# Patient Record
Sex: MALE | Race: WHITE | NOT HISPANIC OR LATINO | Employment: OTHER | URBAN - NONMETROPOLITAN AREA
[De-identification: names, ages, dates, MRNs, and addresses within clinical notes are randomized per-mention and may not be internally consistent; named-entity substitution may affect disease eponyms.]

---

## 2017-01-19 PROCEDURE — 66984 XCAPSL CTRC RMVL W/O ECP: CPT | Performed by: OPHTHALMOLOGY

## 2017-01-20 ENCOUNTER — OFFICE VISIT (OUTPATIENT)
Dept: OPHTHALMOLOGY | Facility: CLINIC | Age: 82
End: 2017-01-20

## 2017-01-20 DIAGNOSIS — Z96.1 PSEUDOPHAKIA: Primary | ICD-10-CM

## 2017-01-20 RX ORDER — DIFLUPREDNATE OPHTHALMIC 0.5 MG/ML
1 EMULSION OPHTHALMIC 4 TIMES DAILY
COMMUNITY
End: 2017-02-14

## 2017-01-20 RX ORDER — OFLOXACIN 3 MG/ML
1 SOLUTION/ DROPS OPHTHALMIC 4 TIMES DAILY
COMMUNITY
End: 2017-02-14

## 2017-01-20 NOTE — PROGRESS NOTES
Subjective   Zeeshan Arnett is a 81 y.o. male.   Chief Complaint   Patient presents with   • Pseudophakia right eye     HPI     1 day s/p CE OD, no pain, Dr Villagomez, H25.11, global 90 days       Last edited by Devon Agudelo MD on 1/20/2017  8:22 AM.       Review of Systems    Objective   Visual Acuity (Snellen - Linear)      Right Left   Dist sc 20/40                    Not recorded            Tonometry (Applanation, 8:29 AM)      Right Left   Pressure 34               Main Ophthalmology Exam     External Exam      Right Left    External Normal Normal      Slit Lamp Exam      Right Left    Lids/Lashes Normal Normal    Conjunctiva/Sclera White and quiet White and quiet    Cornea Clear wound good, neg Megha Clear    Anterior Chamber Trace Cell Deep and quiet    Iris Round and reactive Round and reactive    Lens Posterior chamber intraocular lens 2+ Nuclear sclerosis    Vitreous Normal Normal                Assessment/Plan   Zeeshan was seen today for pseudophakia right eye.    Diagnoses and all orders for this visit:    Pseudophakia  Comments:  s/p CE OD, doing well with IOP incr    Other orders  -     Brinzolamide-Brimonidine (SIMBRINZA) 1-0.2 % suspension; One drop OD tid      Durezol bid, oflox qid, Ilevro daily, Simbrinza tid, Gaona shield hs  F/u 4 days  Copy to Dr Villagomez

## 2017-01-20 NOTE — MR AVS SNAPSHOT
Zeeshan Arnett   1/20/2017 8:00 AM   Office Visit    Dept Phone:  [phone]   Encounter #:  [id number]    Provider:  Devon Agudelo MD   Department:  [facility] OPHTHALMOLOGY                Your Full Care Plan              Today's Medication Changes          These changes are accurate as of: 1/20/17  8:29 AM.  If you have any questions, ask your nurse or doctor.               New Medication(s)Ordered:     Brinzolamide-Brimonidine 1-0.2 % suspension   Commonly known as:  SIMBRINZA   One drop OD tid   Started by:  Devon Agudelo MD            Where to Get Your Medications      Information about where to get these medications is not yet available     ! Ask your nurse or doctor about these medications     Brinzolamide-Brimonidine 1-0.2 % suspension                  Your Updated Medication List          This list is accurate as of: 1/20/17  8:29 AM.  Always use your most recent med list.                aspirin 81 MG chewable tablet       Brinzolamide-Brimonidine 1-0.2 % suspension   Commonly known as:  SIMBRINZA   One drop OD tid       clopidogrel 75 MG tablet   Commonly known as:  PLAVIX   Take 1 tablet by mouth Daily.       DUREZOL 0.05 % ophthalmic emulsion   Generic drug:  difluprednate       ILEVRO 0.3 % suspension   Generic drug:  Nepafenac       ofloxacin 0.3 % ophthalmic solution   Commonly known as:  OCUFLOX               You Were Diagnosed With        Codes Comments    Pseudophakia    -  Primary ICD-10-CM: Z96.1  ICD-9-CM: V43.1 s/p CE OD, doing well with IOP incr      Instructions     None    Patient Instructions History      Upcoming Appointments     Visit Type Date Time Department    OFFICE VISIT 1/20/2017  8:00 AM [facility] OPHTHALMOLOGY MAD    OFFICE VISIT 1/24/2017  9:20 AM [facility] OPHTHALMOLOGY MAD    FOLLOW UP - ONCOLOGY 4/20/2017  8:30 AM [facility] RAD ONC [facility]      MyChart Signup     [facility] allows you to send messages to your doctor,  view your test results, renew your prescriptions, schedule appointments, and more. To sign up, go to [employer] and click on the Sign Up Now link in the New User? box. Enter your OnState Activation Code exactly as it appears below along with the last four digits of your Social Security Number and your Date of Birth () to complete the sign-up process. If you do not sign up before the expiration date, you must request a new code.    OnState Activation Code: [email or web address]  Expires: 2/3/2017  8:29 AM    If you have questions, you can email [email or web address] or call [phone] to talk to our OnState staff. Remember, OnState is NOT to be used for urgent needs. For medical emergencies, dial 911.               Other Info from Your Visit           Your Appointments     2017  9:20 AM CST   Office Visit with Devon Agudelo MD   [facility] MEDICAL [facility] OPHTHALMOLOGY (--)    [address] [place] [address] [address]  [address] [place] [id number] [phone]           Arrive 15 minutes prior to appointment.            2017  8:30 AM CDT   FOLLOW UP with Aguila Rodriguez MD   [facility] RADIATION ONCOLOGY (--)    [address] [place] [address] [address] [place] [address] [phone]              Allergies     No Known Allergies      Reason for Visit     Pseudophakia right eye           Vital Signs     Smoking Status                   Former Smoker           Problems and Diagnoses Noted     Pseudophakia    -  Primary

## 2017-01-24 ENCOUNTER — OFFICE VISIT (OUTPATIENT)
Dept: OPHTHALMOLOGY | Facility: CLINIC | Age: 82
End: 2017-01-24

## 2017-01-24 DIAGNOSIS — Z96.1 PSEUDOPHAKIA: Primary | ICD-10-CM

## 2017-01-24 PROCEDURE — 99024 POSTOP FOLLOW-UP VISIT: CPT | Performed by: OPHTHALMOLOGY

## 2017-01-24 NOTE — MR AVS SNAPSHOT
Zeeshan Arnett   2017 9:20 AM   Office Visit    Dept Phone:  [phone]   Encounter #:  [id number]    Provider:  Devon Agudelo MD   Department:  [facility] OPHTHALMOLOGY                Your Full Care Plan              Your Updated Medication List          This list is accurate as of: 17 10:33 AM.  Always use your most recent med list.                aspirin 81 MG chewable tablet       Brinzolamide-Brimonidine 1-0.2 % suspension   Commonly known as:  SIMBRINZA   One drop OD tid       clopidogrel 75 MG tablet   Commonly known as:  PLAVIX   Take 1 tablet by mouth Daily.       DUREZOL 0.05 % ophthalmic emulsion   Generic drug:  difluprednate       ILEVRO 0.3 % suspension   Generic drug:  Nepafenac       ofloxacin 0.3 % ophthalmic solution   Commonly known as:  OCUFLOX               You Were Diagnosed With        Codes Comments    Pseudophakia    -  Primary ICD-10-CM: Z96.1  ICD-9-CM: V43.1 s/p CE OD, doing well, iOP down      Instructions     None    Patient Instructions History      Upcoming Appointments     Visit Type Date Time Department    OFFICE VISIT 2017  9:20 AM [facility] OPHTHALMOLOGY MAD    OFFICE VISIT 2017  8:30 AM [facility] OPHTHALMOLOGY MAD    FOLLOW UP - ONCOLOGY 2017  8:30 AM [facility] RAD ONC [facility]      RUNhart Signup     [facility] [employer] allows you to send messages to your doctor, view your test results, renew your prescriptions, schedule appointments, and more. To sign up, go to [employer] and click on the Sign Up Now link in the New User? box. Enter your Warp 9 Activation Code exactly as it appears below along with the last four digits of your Social Security Number and your Date of Birth () to complete the sign-up process. If you do not sign up before the expiration date, you must request a new code.    Warp 9 Activation Code: [email or web address]  Expires: 2/3/2017  8:29 AM    If you have questions, you can  email [email or web address] or call [phone] to talk to our MyChart staff. Remember, MyChart is NOT to be used for urgent needs. For medical emergencies, dial 911.               Other Info from Your Visit           Your Appointments     Feb 14, 2017  8:30 AM CST   Office Visit with Devon Agudelo MD   [facility] OPHTHALMOLOGY (--)    [address] [place] [address] [address]  [facility] [address] [place] [id number] [phone]           Arrive 15 minutes prior to appointment.            Apr 20, 2017  8:30 AM CDT   FOLLOW UP with Aguila Rodriguez MD   [facility] RADIATION ONCOLOGY (--)    [address] [place] [address] [address] [place] [address] [phone]              Allergies     No Known Allergies      Reason for Visit     Pseudophakia right eye           Vital Signs     Smoking Status                   Former Smoker           Problems and Diagnoses Noted     Pseudophakia    -  Primary

## 2017-01-24 NOTE — PROGRESS NOTES
Subjective   Zeeshan Arnett is a 81 y.o. male.   Chief Complaint   Patient presents with   • Pseudophakia right eye         Review of Systems    Objective   Visual Acuity (Snellen - Linear)      Right Left   Dist sc 20/50 20/60            Manifest Refraction      Sphere Cylinder Axis Dist   Right -0.25 +0.50 060 20/30+   Left +0.50 +1.00 160 20/40                Not recorded            Tonometry      Right Left   Pressure 17               Main Ophthalmology Exam     External Exam      Right Left    External Normal Normal      Slit Lamp Exam      Right Left    Lids/Lashes Normal Normal    Conjunctiva/Sclera White and quiet White and quiet    Cornea Clear Clear    Anterior Chamber Trace Cell Deep and quiet    Iris Round and reactive Round and reactive    Lens Posterior chamber intraocular lens 2+ Nuclear sclerosis    Vitreous Normal Normal                Assessment/Plan   Zeeshan was seen today for pseudophakia right eye.    Diagnoses and all orders for this visit:    Pseudophakia  Comments:  s/p CE OD, doing well, iOP down      Durezol/Ilevro daily  Oflox/Simbrinza x 5 days  F/u 3 weeks

## 2017-02-14 ENCOUNTER — OFFICE VISIT (OUTPATIENT)
Dept: OPHTHALMOLOGY | Facility: CLINIC | Age: 82
End: 2017-02-14

## 2017-02-14 DIAGNOSIS — IMO0002 NUCLEAR CATARACT, LEFT: ICD-10-CM

## 2017-02-14 DIAGNOSIS — Z96.1 PSEUDOPHAKIA: Primary | ICD-10-CM

## 2017-02-14 PROCEDURE — 99024 POSTOP FOLLOW-UP VISIT: CPT | Performed by: OPHTHALMOLOGY

## 2017-02-14 NOTE — PROGRESS NOTES
Subjective   Zeeshan Arnett is a 81 y.o. male.   Chief Complaint   Patient presents with   • Artificial Lens Present     HPI     1 month s/p CE OD, no complaints; pt not sure about cataract surgery OS       Last edited by Devon Agudelo MD on 2/14/2017  8:57 AM.       Review of Systems    Objective   Visual Acuity (Snellen - Linear)      Right Left   Dist sc 20/25 -1 20/70 -1            Manifest Refraction      Sphere Cylinder Axis Dist   Right -0.25 +0.50 055 20/20-   Left +0.25 +1.00 155 20/40-            Final Rx      Sphere Cylinder Axis Add   Right -0.25 +0.50 055 +2.50   Left +0.25 +1.00 155 +2.50             Not recorded            Tonometry      Right Left   Pressure 19               Main Ophthalmology Exam     External Exam      Right Left    External Normal Normal      Slit Lamp Exam      Right Left    Lids/Lashes Normal Normal    Conjunctiva/Sclera White and quiet White and quiet    Cornea Clear Clear    Anterior Chamber Deep and quiet Deep and quiet    Iris Round and reactive Round and reactive    Lens Posterior chamber intraocular lens 2+ Nuclear sclerosis    Vitreous Normal Normal                Assessment/Plan   Diagnoses and all orders for this visit:    Pseudophakia  Comments:  s/p CE OD, doing well    Nuclear cataract, left       dc drops  Glasses Rx given per refraction or cataract surgery OS if desired, f/u with Dr Villagomez         Return in about 3 months (around 5/14/2017).

## 2017-04-20 ENCOUNTER — OFFICE VISIT (OUTPATIENT)
Dept: RADIATION ONCOLOGY | Facility: HOSPITAL | Age: 82
End: 2017-04-20

## 2017-04-20 ENCOUNTER — HOSPITAL ENCOUNTER (OUTPATIENT)
Dept: RADIATION ONCOLOGY | Facility: HOSPITAL | Age: 82
Setting detail: RADIATION/ONCOLOGY SERIES
End: 2017-04-20

## 2017-04-20 VITALS
HEART RATE: 79 BPM | BODY MASS INDEX: 23.73 KG/M2 | DIASTOLIC BLOOD PRESSURE: 78 MMHG | WEIGHT: 165.4 LBS | RESPIRATION RATE: 18 BRPM | TEMPERATURE: 98.2 F | SYSTOLIC BLOOD PRESSURE: 147 MMHG

## 2017-04-20 DIAGNOSIS — C32.0 CANCER OF VOCAL CORD (HCC): Primary | ICD-10-CM

## 2017-04-20 PROCEDURE — 99213 OFFICE O/P EST LOW 20 MIN: CPT | Performed by: RADIOLOGY

## 2017-04-20 PROCEDURE — G0463 HOSPITAL OUTPT CLINIC VISIT: HCPCS | Performed by: RADIOLOGY

## 2017-04-20 NOTE — PROGRESS NOTES
Patient:  Zeeshan Arnett  :  1935  Medical Record Number:  [id number]    Encounter Date:  2017      Diagnosis:      ICD-10-CM ICD-9-CM   1. 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  He denies any increasing shortness of breath cough, hoarseness of voice or dysphagia.  His last follow-up with Dr. Puga and direct laryngoscopy the exam was normal.  He is busy taking care of his wife at home and working hard in the yard.    Medications:  Medication reconciliation for the patient was reviewed and confirmed in the electronic medical record    History of Present Illness : Radiation follow-up     Review of Systems:    Review of Systems   Constitutional:        No Problems unless he works for 6 hrs then gets tired?       CONSTITUTIONAL: No  complaint of fatigue. Denies any fever, chills or weight loss.     HEENT:  No epistaxis, mouth sores or difficulty swallowing.    RESPIRATORY:  No complaint of shortness of breath. cough . Denies any hemoptysis.    CARDIOVASCULAR:  No chest pain or palpitations.    GASTROINTESTINAL:  No abdominal pain nausea, vomiting or blood in the stool, diarrhea.    GENITOURINARY:  Denies any hematuria. Dysuria, urgency or nocturia    MUSCULOSKELETAL: No complaint of bony pain,     LYMPHATICS:  Denies any abnormal swollen glands, lumps or bumps anywhere in the body.    Other six system reviewed and are negative.    Physical Exam:     Vitals:    Vitals:    17 1317   BP: 147/78   Pulse: 79   Resp: 18   Temp: 98.2 °F (36.8 °C)     BMI:  Body mass index is 23.73 kg/(m^2).     Patient was counseled on current BMI.   Patient was encouraged for diet, exercise, and healthy lifestyle.  BSA:  Body surface area is 1.93 meters squared.    Performance Status:  (0) Fully active, able to carry on all predisease performance without restriction  ACP: Advanced Care Planning was discussed. The patient does not have a living will documented in the medical record and declined to perform one today.  Smoking Cessation: Counseling given: Yes   his former smoker and not planning to restart.  Physical Exam  :    Constitutional:  Well-developed, well-nourished white male   not in acute distress. Alert and oriented x 3.     HEENT:  Pupils reactive to light, conjunctivae pink. Oral cavity/oropharyngeal exam normal.  Direct laryngoscopic exam felt to show any tumor.  Post radiation changes notice and glottic area, vocal cord movement is normal.    Neck:  Supple, no thyromegaly.    Lymphatics:  There is no lymphadenopathy in head, neck, supraclaivicular, axillary, or inguinal area.    Lungs:  Clear on auscultation,no wheezing, crepitation.    Heart:  Rate and rhythm regular, no murmur    Abdomen: Soft, without organomegaly, non-tender, non-distended. No other masses palpable in abdomen. Bowel sounds are present.    Extremities:  Without cyanosis or edema, no clubbing.    Spine:  No tenderness    Skin:  Warm, dry, normal texture. No sign of jaundice.    Neurologic:  No focal neurological deficits. Physiologically intact. Normal gait.    Psychologic:  Alert and oriented x 3, good inisight.    Diagnostic Data:    Admission on 03/10/2016, Discharged on 03/13/2016   Component Date Value Ref Range Status   • WBC 03/10/2016 5.6  3.2 - 9.8 x1000/uL Final   • RBC 03/10/2016 4.20* 4.37 - 5.74 ayala/mm3 Final   • Hemoglobin 03/10/2016 14.1  13.7 - 17.3 gm/dl Final   • Hematocrit 03/10/2016 39.9  39.0 - 49.0 % Final   • MCV 03/10/2016 95.0  80.0 - 98.0 fl Final   • MCH 03/10/2016 33.6  26.0 - 34.0 pg Final   • MCHC 03/10/2016 35.3  31.5 - 36.3 gm/dl Final   • RDW  03/10/2016 12.7  11.5 - 14.5 % Final   • Platelets 03/10/2016 144* 150 - 450 x1000/mm3 Final   • MPV 03/10/2016 9.0  8.0 - 12.0 fl Final   • Neutrophil Rel % 03/10/2016 66.2  37.0 - 80.0 % Final   • Lymphocyte Rel % 03/10/2016 21.0  10.0 - 50.0 % Final   • Monocyte Rel % 03/10/2016 9.7  0.0 - 12.0 % Final   • Eosinophil Rel % 03/10/2016 2.5  0.0 - 7.0 % Final   • Basophil Rel % 03/10/2016 0.4  0.0 - 2.0 % Final   • Immature Granulocyte Rel % 03/10/2016 0.20  0.00 - 0.50 % Final   • Neutrophils Absolute 03/10/2016 3.70  2.00 - 8.60 x1000/uL Final   • Lymphocytes Absolute 03/10/2016 1.17  0.60 - 4.20 x1000/uL Final   • Monocytes Absolute 03/10/2016 0.54  0.00 - 0.90 x1000/uL Final   • Eosinophils Absolute 03/10/2016 0.14  0.00 - 0.70 x1000/uL Final   • Basophils Absolute 03/10/2016 0.02  0.00 - 0.20 x1000/uL Final   • Immature Granulocytes Absolute 03/10/2016 0.010  0.005 - 0.022 x1000/uL Final   • Sodium 03/10/2016 141  137 - 145 mmol/L Final   • Potassium 03/10/2016 4.0  3.5 - 5.1 mmol/L Final   • Chloride 03/10/2016 108  95 - 110 mmol/L Final   • CO2 03/10/2016 25  22 - 31 mmol/L Final   • Anion Gap 03/10/2016 8.0  5.0 - 15.0 mmol/L Final   • Glucose 03/10/2016 91  60 - 100 mg/dl Final   • BUN 03/10/2016 18  7 - 21 mg/dl Final   • Creatinine 03/10/2016 0.7  0.7 - 1.3 mg/dl Final   • GFR MDRD Non  03/10/2016 109* 42 - 98 mL/min/1.73 sq.M Final   • GFR MDRD  03/10/2016 132* 42 - 98 mL/min/1.73 sq.M Final   • Calcium 03/10/2016 8.6  8.4 - 10.2 mg/dl Final   • Prealbumin 03/10/2016 19.1  17.6 - 36.0 mg/dl Final   • Protime 03/10/2016 14.6  11.1 - 15.3 seconds Final   • INR 03/10/2016 1.1  0.0 - 3.5 Final   • PTT 03/10/2016 33.4  22.5 - 36.7 seconds Final       Quality of Life:  100 - Full Activity    Time Spent Face to Face:  90 % of total 15 minute time spent in counseling, coordination of care, overall prognosis,   Impression:  Oncology Impressions: Cancer view point - disease in  remission    Plan:  1.  Patient was offered to come back and see other radiation oncologists in 6 months however he has decided to follow with Dr. Puga.  2.  Chances of Chiari is higher reassurance given, patient was happy knowing that.  3.  He has been encouraged advised to live healthy lifestyle.    Follow Up:  Return if symptoms worsen or fail to improve.    Sincerely,    Electronically signed by:    Aguila Rodriguez MD April 20, 2017 1:56 PM     Radiation [place]    CC:  Dr. Puga

## 2017-11-13 DIAGNOSIS — I65.23 BILATERAL CAROTID ARTERY STENOSIS: Primary | ICD-10-CM

## 2017-11-14 ENCOUNTER — APPOINTMENT (OUTPATIENT)
Dept: LAB | Facility: HOSPITAL | Age: 82
End: 2017-11-14

## 2017-11-14 ENCOUNTER — OFFICE VISIT (OUTPATIENT)
Dept: CARDIAC SURGERY | Facility: CLINIC | Age: 82
End: 2017-11-14

## 2017-11-14 VITALS
SYSTOLIC BLOOD PRESSURE: 120 MMHG | TEMPERATURE: 96.9 F | DIASTOLIC BLOOD PRESSURE: 70 MMHG | HEART RATE: 83 BPM | HEIGHT: 70 IN | OXYGEN SATURATION: 98 % | BODY MASS INDEX: 22.9 KG/M2 | WEIGHT: 160 LBS

## 2017-11-14 DIAGNOSIS — I65.23 OCCLUSION AND STENOSIS OF BILATERAL CAROTID ARTERIES: Primary | ICD-10-CM

## 2017-11-14 LAB
ALBUMIN SERPL-MCNC: 4.2 G/DL (ref 3.4–4.8)
ALBUMIN/GLOB SERPL: 1.2 G/DL (ref 1.1–1.8)
ALP SERPL-CCNC: 83 U/L (ref 38–126)
ALT SERPL W P-5'-P-CCNC: 40 U/L (ref 21–72)
ANION GAP SERPL CALCULATED.3IONS-SCNC: 10 MMOL/L (ref 5–15)
AST SERPL-CCNC: 37 U/L (ref 17–59)
BASOPHILS # BLD AUTO: 0.02 10*3/MM3 (ref 0–0.2)
BASOPHILS NFR BLD AUTO: 0.3 % (ref 0–2)
BILIRUB SERPL-MCNC: 0.6 MG/DL (ref 0.2–1.3)
BUN BLD-MCNC: 18 MG/DL (ref 7–21)
BUN/CREAT SERPL: 21.2 (ref 7–25)
CALCIUM SPEC-SCNC: 9.3 MG/DL (ref 8.4–10.2)
CHLORIDE SERPL-SCNC: 105 MMOL/L (ref 95–110)
CO2 SERPL-SCNC: 29 MMOL/L (ref 22–31)
CREAT BLD-MCNC: 0.85 MG/DL (ref 0.7–1.3)
DEPRECATED RDW RBC AUTO: 46 FL (ref 35.1–43.9)
EOSINOPHIL # BLD AUTO: 0.09 10*3/MM3 (ref 0–0.7)
EOSINOPHIL NFR BLD AUTO: 1.2 % (ref 0–7)
ERYTHROCYTE [DISTWIDTH] IN BLOOD BY AUTOMATED COUNT: 12.8 % (ref 11.5–14.5)
GFR SERPL CREATININE-BSD FRML MDRD: 87 ML/MIN/1.73 (ref 42–98)
GLOBULIN UR ELPH-MCNC: 3.5 GM/DL (ref 2.3–3.5)
GLUCOSE BLD-MCNC: 103 MG/DL (ref 60–100)
HCT VFR BLD AUTO: 43.7 % (ref 39–49)
HGB BLD-MCNC: 15.2 G/DL (ref 13.7–17.3)
IMM GRANULOCYTES # BLD: 0.02 10*3/MM3 (ref 0–0.02)
IMM GRANULOCYTES NFR BLD: 0.3 % (ref 0–0.5)
LYMPHOCYTES # BLD AUTO: 1.51 10*3/MM3 (ref 0.6–4.2)
LYMPHOCYTES NFR BLD AUTO: 19.6 % (ref 10–50)
MCH RBC QN AUTO: 34 PG (ref 26.5–34)
MCHC RBC AUTO-ENTMCNC: 34.8 G/DL (ref 31.5–36.3)
MCV RBC AUTO: 97.8 FL (ref 80–98)
MONOCYTES # BLD AUTO: 0.66 10*3/MM3 (ref 0–0.9)
MONOCYTES NFR BLD AUTO: 8.5 % (ref 0–12)
NEUTROPHILS # BLD AUTO: 5.42 10*3/MM3 (ref 2–8.6)
NEUTROPHILS NFR BLD AUTO: 70.1 % (ref 37–80)
PLATELET # BLD AUTO: 152 10*3/MM3 (ref 150–450)
PMV BLD AUTO: 9.3 FL (ref 8–12)
POTASSIUM BLD-SCNC: 4.6 MMOL/L (ref 3.5–5.1)
PROT SERPL-MCNC: 7.7 G/DL (ref 6.3–8.6)
RBC # BLD AUTO: 4.47 10*6/MM3 (ref 4.37–5.74)
SODIUM BLD-SCNC: 144 MMOL/L (ref 137–145)
WBC NRBC COR # BLD: 7.72 10*3/MM3 (ref 3.2–9.8)

## 2017-11-14 PROCEDURE — 99213 OFFICE O/P EST LOW 20 MIN: CPT | Performed by: NURSE PRACTITIONER

## 2017-11-14 PROCEDURE — 36415 COLL VENOUS BLD VENIPUNCTURE: CPT | Performed by: NURSE PRACTITIONER

## 2017-11-14 PROCEDURE — 80053 COMPREHEN METABOLIC PANEL: CPT | Performed by: NURSE PRACTITIONER

## 2017-11-14 PROCEDURE — 85025 COMPLETE CBC W/AUTO DIFF WBC: CPT | Performed by: NURSE PRACTITIONER

## 2017-11-14 RX ORDER — CLOPIDOGREL BISULFATE 75 MG/1
75 TABLET ORAL DAILY
Qty: 90 TABLET | Refills: 4 | Status: SHIPPED | OUTPATIENT
Start: 2017-11-14 | End: 2018-12-04 | Stop reason: SDUPTHER

## 2017-11-14 NOTE — PATIENT INSTRUCTIONS
Mild Carotid Stenosis-Stable  Patent RIGHT Stent  If you should experience any neurological symptoms including but not limited to visual or speech disturbances confusion, seizures, or weakness of limbs of one side of your body notify Heart and Vascular center immediately for evaluation or if after hours present to the nearest Emergency Department.    Medical Management: ASA,STATIN     Obtain CBC, CMP today (Per Pt)    Return 1 year

## 2017-11-16 LAB
BH CV XLRA MEAS CAROTID RIGHT ICA/CCA DIASTOLIC RATIO: 2.2
BH CV XLRA MEAS LEFT DIST CCA EDV: 15 CM/SEC
BH CV XLRA MEAS LEFT DIST CCA PSV: 73 CM/SEC
BH CV XLRA MEAS LEFT DIST ICA EDV: 19 CM/SEC
BH CV XLRA MEAS LEFT DIST ICA PSV: 73 CM/SEC
BH CV XLRA MEAS LEFT ICA/CCA DIASTOLIC RATIO: 1.4
BH CV XLRA MEAS LEFT ICA/CCA RATIO: 1
BH CV XLRA MEAS LEFT MID ICA EDV: 20 CM/SEC
BH CV XLRA MEAS LEFT MID ICA PSV: 73 CM/SEC
BH CV XLRA MEAS LEFT PROX CCA EDV: 14 CM/SEC
BH CV XLRA MEAS LEFT PROX CCA PSV: 76 CM/SEC
BH CV XLRA MEAS LEFT PROX ECA EDV: 10 CM/SEC
BH CV XLRA MEAS LEFT PROX ECA PSV: 79 CM/SEC
BH CV XLRA MEAS LEFT PROX ICA EDV: 15 CM/SEC
BH CV XLRA MEAS LEFT PROX ICA PSV: 66 CM/SEC
BH CV XLRA MEAS LEFT VERTEBRAL A EDV: 9 CM/SEC
BH CV XLRA MEAS LEFT VERTEBRAL A PSV: 40 CM/SEC
BH CV XLRA MEAS RIGHT DIST CCA EDV: 12 CM/SEC
BH CV XLRA MEAS RIGHT DIST CCA PSV: 53 CM/SEC
BH CV XLRA MEAS RIGHT DIST ICA EDV: 23 CM/SEC
BH CV XLRA MEAS RIGHT DIST ICA PSV: 83 CM/SEC
BH CV XLRA MEAS RIGHT ICA/CCA RATIO: 1.4
BH CV XLRA MEAS RIGHT MID ICA EDV: 26 CM/SEC
BH CV XLRA MEAS RIGHT MID ICA PSV: 97 CM/SEC
BH CV XLRA MEAS RIGHT PROX CCA EDV: 11 CM/SEC
BH CV XLRA MEAS RIGHT PROX CCA PSV: 67 CM/SEC
BH CV XLRA MEAS RIGHT PROX ECA EDV: 53 CM/SEC
BH CV XLRA MEAS RIGHT PROX ECA PSV: 292 CM/SEC
BH CV XLRA MEAS RIGHT PROX ICA EDV: 11 CM/SEC
BH CV XLRA MEAS RIGHT PROX ICA PSV: 43 CM/SEC
BH CV XLRA MEAS RIGHT VERTEBRAL A EDV: 13 CM/SEC
BH CV XLRA MEAS RIGHT VERTEBRAL A PSV: 46 CM/SEC

## 2017-11-27 NOTE — PROGRESS NOTES
Subjective   Patient ID: Zeeshan Arnett is a 82 y.o. male is here today for follow-up CAROTID STENOSIS.    Carotid Artery Disease   This is a chronic problem. The current episode started more than 1 year ago. The problem has been unchanged. Pertinent negatives include no abdominal pain, numbness or weakness. Nothing aggravates the symptoms.     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  moderate bruit noted on exam. carotid stenosis noted on CT neck chest for 3 month cancer follow up.  asymptomatic.  no TIA, Stroke, Amaurosis fugax.  quit smoking 6/2015.  no other associated symptoms or modifying factors. Returns for follow up carotid stenting. Recovery was complicated by severe bradycardia-asymptomatic. Following with Dr Omer.    12/28/2015 CT Neck Chest:  ill defined fascia planes, soft tissue prominence epiglottis, supraglottic region.  no new mass or adenopathy.  dense calcification both carotid bulbs.  SCOTT 70%.  degenerative CSpine.  stable scattered bilateral pulmonary nodules.  2/22/2016 Carotid Duplex:  SCOTT 80-99%, antegrade.  LICA 16-49% antegrade.  3/10/16: RIGHT Carotid Stent        3/21/16: RIGHT Carotid duplex: SCOTT 16-49% mPSV 125cm/s (mid), Stent. Minimal CCA Plaque        5/2/16: Carotid duplex: SCOTT 16-49% Patent Stent. LICA 16-49% Antegrade flow.         11/14/17: Carotid duplex: SCOTT 0-49% Patent Stent. LICA 0-49% Antegrade flow.    Current Outpatient Prescriptions:   •  aspirin 81 MG chewable tablet, Chew 81 mg Daily., Disp: , Rfl:   •   clopidogrel (PLAVIX) 75 MG tablet, Take 1 tablet by mouth Daily., Disp: 90 tablet, Rfl: 4    Review of Systems   Constitution: Negative for weakness.   Eyes: Negative for visual disturbance.   Cardiovascular: Negative for leg swelling.   Respiratory: Negative for shortness of breath.    Gastrointestinal: Negative for abdominal pain and dysphagia.   Genitourinary: Negative for dysuria.   Neurological: Negative for focal weakness, numbness and paresthesias.   All other systems reviewed and are negative.       Objective    Vitals:    11/14/17 1023   BP: 120/70   Pulse: 83   Temp: 96.9 °F (36.1 °C)   SpO2: 98%       Physical Exam   Constitutional: He is oriented to person, place, and time. He appears well-developed.   HENT:   Head: Normocephalic.   Neck: Carotid bruit is not present.   Cardiovascular: Normal rate and intact distal pulses.    Pulmonary/Chest: Effort normal and breath sounds normal.   Abdominal: Soft.   Musculoskeletal: Normal range of motion. He exhibits no edema.   Neurological: He is alert and oriented to person, place, and time. No cranial nerve deficit.   Skin: Skin is dry.   Vitals reviewed.      Assessment/Plan   Independent Review of Radiographic Studies:    Detailed discussion regarding risks, benefits, and treatment plan.  Patient understands, agrees, and wishes to proceed with plan.  Progressing Well    1. Occlusion and stenosis of bilateral carotid arteries  Mild Carotid Stenosis-Stable  Patent RIGHT Stent  If you should experience any neurological symptoms including but not limited to visual or speech disturbances confusion, seizures, or weakness of limbs of one side of your body notify Heart and Vascular center immediately for evaluation or if after hours present to the nearest Emergency Department.    Medical Management: ASA,STATIN     - Duplex Carotid Ultrasound CAR; Future (1yr)  - clopidogrel (PLAVIX) 75 MG tablet; Take 1 tablet by mouth Daily.  Dispense: 90 tablet; Refill: 4  - CBC Auto  Differential  - Comprehensive Metabolic Panel

## 2018-12-04 DIAGNOSIS — I65.23 OCCLUSION AND STENOSIS OF BILATERAL CAROTID ARTERIES: ICD-10-CM

## 2018-12-04 RX ORDER — CLOPIDOGREL BISULFATE 75 MG/1
75 TABLET ORAL DAILY
Qty: 90 TABLET | Refills: 4 | Status: SHIPPED | OUTPATIENT
Start: 2018-12-04

## 2018-12-12 ENCOUNTER — OFFICE VISIT (OUTPATIENT)
Dept: CARDIAC SURGERY | Facility: CLINIC | Age: 83
End: 2018-12-12

## 2018-12-12 VITALS
WEIGHT: 155 LBS | SYSTOLIC BLOOD PRESSURE: 136 MMHG | OXYGEN SATURATION: 99 % | DIASTOLIC BLOOD PRESSURE: 70 MMHG | HEIGHT: 67 IN | HEART RATE: 76 BPM | BODY MASS INDEX: 24.33 KG/M2

## 2018-12-12 DIAGNOSIS — I65.23 OCCLUSION AND STENOSIS OF BILATERAL CAROTID ARTERIES: Primary | ICD-10-CM

## 2018-12-12 PROCEDURE — 99213 OFFICE O/P EST LOW 20 MIN: CPT | Performed by: NURSE PRACTITIONER

## 2018-12-12 NOTE — PATIENT INSTRUCTIONS
The results of your carotid ultrasound shows mild disease of the right carotid and is stable from previous exam, ultrasound of the left carotid shows mild disease and is stable from previous exam.    Medical Management: ASA,PLAVIX  If you should experience any neurological symptoms including but not limited to visual or speech disturbances confusion, seizures, or weakness of limbs of one side of your body notify Heart and Vascular center immediately for evaluation or if after hours present to the nearest Emergency Department.    Return 1 year- Carotid duplex

## 2018-12-19 NOTE — PROGRESS NOTES
Subjective   Patient ID: Zeeshan Arnett is a 83 y.o. male is here today for follow-up CAROTID STENOSIS.    Carotid Artery Disease   This is a chronic problem. The current episode started more than 1 year ago. The problem has been unchanged. Pertinent negatives include no abdominal pain, numbness or weakness. Nothing aggravates the symptoms.     The following portions of the patient's history were reviewed and updated as appropriate: allergies, current medications, past family history, past medical history, past social history, past surgical history and problem list  PCP:  Radha ZENG    83yr man with HTN, Carotid Stenosis, laryngeal CA (completed XRT 8/2015).  moderate bruit noted on exam. carotid stenosis noted on CT neck chest for 3 month cancer follow up.  asymptomatic.  no TIA, Stroke, Amaurosis fugax.  quit smoking 6/2015.  no other associated symptoms or modifying factors. Returns for follow up carotid stenting. Recovery was complicated by severe bradycardia-asymptomatic. Following with Dr Omer.    12/28/2015 CT Neck Chest:  ill defined fascia planes, soft tissue prominence epiglottis, supraglottic region.  no new mass or adenopathy.  dense calcification both carotid bulbs.  SCOTT 70%.  degenerative CSpine.  stable scattered bilateral pulmonary nodules.  2/22/2016 Carotid Duplex:  SCOTT 80-99%, antegrade.  LICA 16-49% antegrade.  3/10/16: RIGHT Carotid Stent        3/21/16: RIGHT Carotid duplex: SCOTT 16-49% mPSV 125cm/s (mid), Stent. Minimal CCA Plaque        5/2/16: Carotid duplex: SCOTT 16-49% Patent Stent. LICA 16-49% Antegrade flow.         11/14/17: Carotid duplex: SCOTT 0-49% Patent Stent. LICA 0-49% Antegrade flow.        12/12/18: Carotid duplex: SCOTT 0-49% Patent Stent. LICA 0-49% Antegrade flow  Past Medical History:   Diagnosis Date   • Essential hypertension    • Malignant neoplasm of larynx (CMS/HCC)      unspecified - XRT completed 8/2015      • Occlusion and stenosis of bilateral  carotid arteries     SCOTT 90%, LICA 30% 3/10/16: RIGHT CAROTID STENT      • Plantar wart       Past Surgical History:   Procedure Laterality Date   • CATARACT EXTRACTION Right 01/19/2017   • EXCISION LESION  04/03/2015   • TRANSESOPHAGEAL ECHOCARDIOGRAM (BINTA)  02/22/2016    with color flow-Normal LV function with Ef of 65%.Normal RV size and function.Grade 1A diastolic dysfunction.Dilated tubular ascending aorta at 4.3 cm.No sig.valvular regurg or stenosis.        Current Outpatient Medications:   •  aspirin 81 MG chewable tablet, Chew 81 mg Daily., Disp: , Rfl:   •  clopidogrel (PLAVIX) 75 MG tablet, TAKE 1 TABLET BY MOUTH DAILY., Disp: 90 tablet, Rfl: 4    Review of Systems   Constitution: Negative for weakness.   Eyes: Negative for visual disturbance.   Cardiovascular: Negative for leg swelling.   Respiratory: Negative for shortness of breath.    Gastrointestinal: Negative for abdominal pain and dysphagia.   Genitourinary: Negative for dysuria.   Neurological: Negative for focal weakness, numbness and paresthesias.   All other systems reviewed and are negative.       Objective    Vitals:    12/12/18 1112   BP: 136/70   Pulse: 76   SpO2: 99%       Physical Exam   Constitutional: He is oriented to person, place, and time. He appears well-developed.   HENT:   Head: Normocephalic.   Neck: Carotid bruit is not present.   Cardiovascular: Normal rate and intact distal pulses.   Pulmonary/Chest: Effort normal and breath sounds normal.   Abdominal: Soft.   Musculoskeletal: Normal range of motion. He exhibits no edema.   Neurological: He is alert and oriented to person, place, and time. No cranial nerve deficit.   Skin: Skin is dry.   Vitals reviewed.      Assessment/Plan   Independent Review of Radiographic Studies:    Detailed discussion regarding risks, benefits, and treatment plan.  Patient understands, agrees, and wishes to proceed with plan.  Progressing Well    1. Occlusion and stenosis of bilateral carotid  arteries  mild Carotid Artery Stenosis bilateral remained stable Asymptomatic  Patent Stent  Medical Management: ASA,PLAVIX  If you should experience any neurological symptoms including but not limited to visual or speech disturbances confusion, seizures, or weakness of limbs of one side of your body notify [facility] center immediately for evaluation or if after hours present to the nearest Emergency Department.    Return 1 year- Carotid duplex  - Duplex Carotid Ultrasound CAR; Future                This document has been electronically signed by AZUCENA Guzman on December 19, 2018 2:43 PM

## 2019-11-19 ENCOUNTER — APPOINTMENT (OUTPATIENT)
Dept: GENERAL RADIOLOGY | Facility: HOSPITAL | Age: 84
End: 2019-11-19

## 2019-11-19 ENCOUNTER — HOSPITAL ENCOUNTER (OUTPATIENT)
Facility: HOSPITAL | Age: 84
Setting detail: OBSERVATION
Discharge: HOME | End: 2019-11-23
Attending: EMERGENCY MEDICINE | Admitting: INTERNAL MEDICINE

## 2019-11-19 ENCOUNTER — APPOINTMENT (OUTPATIENT)
Dept: CT IMAGING | Facility: HOSPITAL | Age: 84
End: 2019-11-19

## 2019-11-19 DIAGNOSIS — Z74.09 IMPAIRED MOBILITY AND ADLS: ICD-10-CM

## 2019-11-19 DIAGNOSIS — R41.82 ALTERED MENTAL STATUS, UNSPECIFIED ALTERED MENTAL STATUS TYPE: Primary | ICD-10-CM

## 2019-11-19 DIAGNOSIS — Z74.09 IMPAIRED FUNCTIONAL MOBILITY, BALANCE, GAIT, AND ENDURANCE: ICD-10-CM

## 2019-11-19 DIAGNOSIS — Z78.9 IMPAIRED MOBILITY AND ADLS: ICD-10-CM

## 2019-11-19 LAB
ALBUMIN SERPL-MCNC: 3.8 G/DL (ref 3.5–5.2)
ALBUMIN/GLOB SERPL: 1 G/DL
ALP SERPL-CCNC: 139 U/L (ref 39–117)
ALT SERPL W P-5'-P-CCNC: 23 U/L (ref 1–41)
ANION GAP SERPL CALCULATED.3IONS-SCNC: 11 MMOL/L (ref 5–15)
AST SERPL-CCNC: 23 U/L (ref 1–40)
BACTERIA UR QL AUTO: ABNORMAL /HPF
BASOPHILS # BLD AUTO: 0.03 10*3/MM3 (ref 0–0.2)
BASOPHILS NFR BLD AUTO: 0.3 % (ref 0–1.5)
BILIRUB SERPL-MCNC: 0.6 MG/DL (ref 0.2–1.2)
BILIRUB UR QL STRIP: NEGATIVE
BUN BLD-MCNC: 31 MG/DL (ref 8–23)
BUN/CREAT SERPL: 25.2 (ref 7–25)
CALCIUM SPEC-SCNC: 9.8 MG/DL (ref 8.6–10.5)
CHLORIDE SERPL-SCNC: 96 MMOL/L (ref 98–107)
CLARITY UR: CLEAR
CO2 SERPL-SCNC: 32 MMOL/L (ref 22–29)
COLOR UR: YELLOW
CREAT BLD-MCNC: 1.23 MG/DL (ref 0.76–1.27)
DEPRECATED RDW RBC AUTO: 43.6 FL (ref 37–54)
EOSINOPHIL # BLD AUTO: 0.02 10*3/MM3 (ref 0–0.4)
EOSINOPHIL NFR BLD AUTO: 0.2 % (ref 0.3–6.2)
ERYTHROCYTE [DISTWIDTH] IN BLOOD BY AUTOMATED COUNT: 12.5 % (ref 12.3–15.4)
GFR SERPL CREATININE-BSD FRML MDRD: 56 ML/MIN/1.73
GLOBULIN UR ELPH-MCNC: 3.7 GM/DL
GLUCOSE BLD-MCNC: 106 MG/DL (ref 65–99)
GLUCOSE UR STRIP-MCNC: NEGATIVE MG/DL
HCT VFR BLD AUTO: 38.4 % (ref 37.5–51)
HGB BLD-MCNC: 13.5 G/DL (ref 13–17.7)
HGB UR QL STRIP.AUTO: ABNORMAL
HOLD SPECIMEN: NORMAL
HOLD SPECIMEN: NORMAL
HYALINE CASTS UR QL AUTO: ABNORMAL /LPF
IMM GRANULOCYTES # BLD AUTO: 0.02 10*3/MM3 (ref 0–0.05)
IMM GRANULOCYTES NFR BLD AUTO: 0.2 % (ref 0–0.5)
KETONES UR QL STRIP: ABNORMAL
LEUKOCYTE ESTERASE UR QL STRIP.AUTO: NEGATIVE
LYMPHOCYTES # BLD AUTO: 1.11 10*3/MM3 (ref 0.7–3.1)
LYMPHOCYTES NFR BLD AUTO: 11.4 % (ref 19.6–45.3)
MCH RBC QN AUTO: 33.5 PG (ref 26.6–33)
MCHC RBC AUTO-ENTMCNC: 35.2 G/DL (ref 31.5–35.7)
MCV RBC AUTO: 95.3 FL (ref 79–97)
MONOCYTES # BLD AUTO: 0.77 10*3/MM3 (ref 0.1–0.9)
MONOCYTES NFR BLD AUTO: 7.9 % (ref 5–12)
NEUTROPHILS # BLD AUTO: 7.75 10*3/MM3 (ref 1.7–7)
NEUTROPHILS NFR BLD AUTO: 80 % (ref 42.7–76)
NITRITE UR QL STRIP: NEGATIVE
NRBC BLD AUTO-RTO: 0 /100 WBC (ref 0–0.2)
PH UR STRIP.AUTO: 7 [PH] (ref 5–9)
PLATELET # BLD AUTO: 225 10*3/MM3 (ref 140–450)
PMV BLD AUTO: 8.5 FL (ref 6–12)
POTASSIUM BLD-SCNC: 3.9 MMOL/L (ref 3.5–5.2)
PROT SERPL-MCNC: 7.5 G/DL (ref 6–8.5)
PROT UR QL STRIP: ABNORMAL
RBC # BLD AUTO: 4.03 10*6/MM3 (ref 4.14–5.8)
RBC # UR: ABNORMAL /HPF
REF LAB TEST METHOD: ABNORMAL
SODIUM BLD-SCNC: 139 MMOL/L (ref 136–145)
SP GR UR STRIP: 1.02 (ref 1–1.03)
SQUAMOUS #/AREA URNS HPF: ABNORMAL /HPF
TROPONIN T SERPL-MCNC: <0.01 NG/ML (ref 0–0.03)
UROBILINOGEN UR QL STRIP: ABNORMAL
WBC NRBC COR # BLD: 9.7 10*3/MM3 (ref 3.4–10.8)
WBC UR QL AUTO: ABNORMAL /HPF
WHOLE BLOOD HOLD SPECIMEN: NORMAL
WHOLE BLOOD HOLD SPECIMEN: NORMAL

## 2019-11-19 PROCEDURE — 99284 EMERGENCY DEPT VISIT MOD MDM: CPT

## 2019-11-19 PROCEDURE — 25010000002 HYDRALAZINE PER 20 MG: Performed by: EMERGENCY MEDICINE

## 2019-11-19 PROCEDURE — 93010 ELECTROCARDIOGRAM REPORT: CPT | Performed by: INTERNAL MEDICINE

## 2019-11-19 PROCEDURE — P9612 CATHETERIZE FOR URINE SPEC: HCPCS

## 2019-11-19 PROCEDURE — 80053 COMPREHEN METABOLIC PANEL: CPT | Performed by: EMERGENCY MEDICINE

## 2019-11-19 PROCEDURE — 81001 URINALYSIS AUTO W/SCOPE: CPT | Performed by: NURSE PRACTITIONER

## 2019-11-19 PROCEDURE — 96374 THER/PROPH/DIAG INJ IV PUSH: CPT

## 2019-11-19 PROCEDURE — 85025 COMPLETE CBC W/AUTO DIFF WBC: CPT

## 2019-11-19 PROCEDURE — 70450 CT HEAD/BRAIN W/O DYE: CPT

## 2019-11-19 PROCEDURE — 93005 ELECTROCARDIOGRAM TRACING: CPT | Performed by: NURSE PRACTITIONER

## 2019-11-19 PROCEDURE — 84484 ASSAY OF TROPONIN QUANT: CPT | Performed by: EMERGENCY MEDICINE

## 2019-11-19 PROCEDURE — 71046 X-RAY EXAM CHEST 2 VIEWS: CPT

## 2019-11-19 PROCEDURE — G0378 HOSPITAL OBSERVATION PER HR: HCPCS

## 2019-11-19 PROCEDURE — 83036 HEMOGLOBIN GLYCOSYLATED A1C: CPT | Performed by: INTERNAL MEDICINE

## 2019-11-19 RX ORDER — CLOPIDOGREL BISULFATE 75 MG/1
75 TABLET ORAL DAILY
Status: DISCONTINUED | OUTPATIENT
Start: 2019-11-20 | End: 2019-11-23 | Stop reason: HOSPADM

## 2019-11-19 RX ORDER — ACETAMINOPHEN 325 MG/1
650 TABLET ORAL EVERY 4 HOURS PRN
Status: DISCONTINUED | OUTPATIENT
Start: 2019-11-19 | End: 2019-11-23 | Stop reason: HOSPADM

## 2019-11-19 RX ORDER — ASPIRIN 81 MG/1
81 TABLET, CHEWABLE ORAL DAILY
Status: DISCONTINUED | OUTPATIENT
Start: 2019-11-20 | End: 2019-11-23 | Stop reason: HOSPADM

## 2019-11-19 RX ORDER — MAGNESIUM SULFATE HEPTAHYDRATE 40 MG/ML
2 INJECTION, SOLUTION INTRAVENOUS AS NEEDED
Status: DISCONTINUED | OUTPATIENT
Start: 2019-11-19 | End: 2019-11-23 | Stop reason: HOSPADM

## 2019-11-19 RX ORDER — ACETAMINOPHEN 650 MG/1
650 SUPPOSITORY RECTAL EVERY 4 HOURS PRN
Status: DISCONTINUED | OUTPATIENT
Start: 2019-11-19 | End: 2019-11-23 | Stop reason: HOSPADM

## 2019-11-19 RX ORDER — HYDRALAZINE HYDROCHLORIDE 20 MG/ML
10 INJECTION INTRAMUSCULAR; INTRAVENOUS ONCE
Status: COMPLETED | OUTPATIENT
Start: 2019-11-19 | End: 2019-11-19

## 2019-11-19 RX ORDER — FAMOTIDINE 40 MG/1
40 TABLET, FILM COATED ORAL DAILY
Status: DISCONTINUED | OUTPATIENT
Start: 2019-11-20 | End: 2019-11-23 | Stop reason: HOSPADM

## 2019-11-19 RX ORDER — SODIUM CHLORIDE 0.9 % (FLUSH) 0.9 %
10 SYRINGE (ML) INJECTION EVERY 12 HOURS SCHEDULED
Status: DISCONTINUED | OUTPATIENT
Start: 2019-11-20 | End: 2019-11-23 | Stop reason: HOSPADM

## 2019-11-19 RX ORDER — SODIUM CHLORIDE 0.9 % (FLUSH) 0.9 %
10 SYRINGE (ML) INJECTION AS NEEDED
Status: DISCONTINUED | OUTPATIENT
Start: 2019-11-19 | End: 2019-11-23 | Stop reason: HOSPADM

## 2019-11-19 RX ORDER — POTASSIUM CHLORIDE 1.5 G/1.77G
40 POWDER, FOR SOLUTION ORAL AS NEEDED
Status: DISCONTINUED | OUTPATIENT
Start: 2019-11-19 | End: 2019-11-23 | Stop reason: HOSPADM

## 2019-11-19 RX ORDER — ONDANSETRON 2 MG/ML
4 INJECTION INTRAMUSCULAR; INTRAVENOUS EVERY 6 HOURS PRN
Status: DISCONTINUED | OUTPATIENT
Start: 2019-11-19 | End: 2019-11-23 | Stop reason: HOSPADM

## 2019-11-19 RX ORDER — ACETAMINOPHEN 160 MG/5ML
650 SOLUTION ORAL EVERY 4 HOURS PRN
Status: DISCONTINUED | OUTPATIENT
Start: 2019-11-19 | End: 2019-11-19

## 2019-11-19 RX ORDER — ONDANSETRON 4 MG/1
4 TABLET, FILM COATED ORAL EVERY 6 HOURS PRN
Status: DISCONTINUED | OUTPATIENT
Start: 2019-11-19 | End: 2019-11-23 | Stop reason: HOSPADM

## 2019-11-19 RX ORDER — POTASSIUM CHLORIDE 750 MG/1
40 CAPSULE, EXTENDED RELEASE ORAL AS NEEDED
Status: DISCONTINUED | OUTPATIENT
Start: 2019-11-19 | End: 2019-11-23 | Stop reason: HOSPADM

## 2019-11-19 RX ORDER — POTASSIUM CHLORIDE 7.45 MG/ML
10 INJECTION INTRAVENOUS
Status: DISCONTINUED | OUTPATIENT
Start: 2019-11-19 | End: 2019-11-23 | Stop reason: HOSPADM

## 2019-11-19 RX ORDER — MAGNESIUM SULFATE HEPTAHYDRATE 40 MG/ML
4 INJECTION, SOLUTION INTRAVENOUS AS NEEDED
Status: DISCONTINUED | OUTPATIENT
Start: 2019-11-19 | End: 2019-11-23 | Stop reason: HOSPADM

## 2019-11-19 RX ORDER — DOCUSATE SODIUM 100 MG/1
100 CAPSULE, LIQUID FILLED ORAL 2 TIMES DAILY
Status: DISCONTINUED | OUTPATIENT
Start: 2019-11-20 | End: 2019-11-23 | Stop reason: HOSPADM

## 2019-11-19 RX ADMIN — HYDRALAZINE HYDROCHLORIDE 10 MG: 20 INJECTION INTRAMUSCULAR; INTRAVENOUS at 23:05

## 2019-11-20 ENCOUNTER — APPOINTMENT (OUTPATIENT)
Dept: MRI IMAGING | Facility: HOSPITAL | Age: 84
End: 2019-11-20

## 2019-11-20 LAB
FOLATE SERPL-MCNC: 16.8 NG/ML (ref 4.78–24.2)
TSH SERPL DL<=0.05 MIU/L-ACNC: 3.87 UIU/ML (ref 0.27–4.2)
VIT B12 BLD-MCNC: 380 PG/ML (ref 211–946)

## 2019-11-20 PROCEDURE — 84443 ASSAY THYROID STIM HORMONE: CPT | Performed by: INTERNAL MEDICINE

## 2019-11-20 PROCEDURE — G0378 HOSPITAL OBSERVATION PER HR: HCPCS

## 2019-11-20 PROCEDURE — 82746 ASSAY OF FOLIC ACID SERUM: CPT | Performed by: INTERNAL MEDICINE

## 2019-11-20 PROCEDURE — 70553 MRI BRAIN STEM W/O & W/DYE: CPT

## 2019-11-20 PROCEDURE — 25010000002 GADOTERIDOL PER 1 ML: Performed by: INTERNAL MEDICINE

## 2019-11-20 PROCEDURE — A9576 INJ PROHANCE MULTIPACK: HCPCS | Performed by: INTERNAL MEDICINE

## 2019-11-20 PROCEDURE — 82607 VITAMIN B-12: CPT | Performed by: INTERNAL MEDICINE

## 2019-11-20 RX ORDER — HYDRALAZINE HYDROCHLORIDE 25 MG/1
25 TABLET, FILM COATED ORAL EVERY 8 HOURS SCHEDULED
Status: DISCONTINUED | OUTPATIENT
Start: 2019-11-20 | End: 2019-11-21

## 2019-11-20 RX ADMIN — HYDRALAZINE HYDROCHLORIDE 25 MG: 25 TABLET, FILM COATED ORAL at 21:06

## 2019-11-20 RX ADMIN — SODIUM CHLORIDE, PRESERVATIVE FREE 10 ML: 5 INJECTION INTRAVENOUS at 21:06

## 2019-11-20 RX ADMIN — ASPIRIN 81 MG 81 MG: 81 TABLET ORAL at 09:16

## 2019-11-20 RX ADMIN — SODIUM CHLORIDE, PRESERVATIVE FREE 10 ML: 5 INJECTION INTRAVENOUS at 00:10

## 2019-11-20 RX ADMIN — GADOTERIDOL 14 ML: 279.3 INJECTION, SOLUTION INTRAVENOUS at 19:00

## 2019-11-20 RX ADMIN — FAMOTIDINE 40 MG: 40 TABLET ORAL at 09:16

## 2019-11-20 RX ADMIN — CLOPIDOGREL BISULFATE 75 MG: 75 TABLET ORAL at 09:16

## 2019-11-20 RX ADMIN — SODIUM CHLORIDE, PRESERVATIVE FREE 10 ML: 5 INJECTION INTRAVENOUS at 09:15

## 2019-11-20 RX ADMIN — DOCUSATE SODIUM 100 MG: 100 CAPSULE, LIQUID FILLED ORAL at 21:06

## 2019-11-20 RX ADMIN — DOCUSATE SODIUM 100 MG: 100 CAPSULE, LIQUID FILLED ORAL at 09:15

## 2019-11-21 ENCOUNTER — APPOINTMENT (OUTPATIENT)
Dept: MRI IMAGING | Facility: HOSPITAL | Age: 84
End: 2019-11-21

## 2019-11-21 ENCOUNTER — APPOINTMENT (OUTPATIENT)
Dept: GENERAL RADIOLOGY | Facility: HOSPITAL | Age: 84
End: 2019-11-21

## 2019-11-21 LAB
ANION GAP SERPL CALCULATED.3IONS-SCNC: 10 MMOL/L (ref 5–15)
BUN BLD-MCNC: 29 MG/DL (ref 8–23)
BUN/CREAT SERPL: 24.8 (ref 7–25)
CALCIUM SPEC-SCNC: 9.4 MG/DL (ref 8.6–10.5)
CHLORIDE SERPL-SCNC: 96 MMOL/L (ref 98–107)
CO2 SERPL-SCNC: 32 MMOL/L (ref 22–29)
CREAT BLD-MCNC: 1.17 MG/DL (ref 0.76–1.27)
GFR SERPL CREATININE-BSD FRML MDRD: 60 ML/MIN/1.73
GLUCOSE BLD-MCNC: 160 MG/DL (ref 65–99)
HBA1C MFR BLD: 5.1 % (ref 4.8–5.6)
HOLD SPECIMEN: NORMAL
MAGNESIUM SERPL-MCNC: 2.1 MG/DL (ref 1.6–2.4)
POTASSIUM BLD-SCNC: 3.7 MMOL/L (ref 3.5–5.2)
SODIUM BLD-SCNC: 138 MMOL/L (ref 136–145)

## 2019-11-21 PROCEDURE — G0378 HOSPITAL OBSERVATION PER HR: HCPCS

## 2019-11-21 PROCEDURE — 74018 RADEX ABDOMEN 1 VIEW: CPT

## 2019-11-21 PROCEDURE — 97166 OT EVAL MOD COMPLEX 45 MIN: CPT

## 2019-11-21 PROCEDURE — 97162 PT EVAL MOD COMPLEX 30 MIN: CPT

## 2019-11-21 PROCEDURE — 97530 THERAPEUTIC ACTIVITIES: CPT

## 2019-11-21 PROCEDURE — 72148 MRI LUMBAR SPINE W/O DYE: CPT

## 2019-11-21 PROCEDURE — 80048 BASIC METABOLIC PNL TOTAL CA: CPT | Performed by: INTERNAL MEDICINE

## 2019-11-21 PROCEDURE — 99214 OFFICE O/P EST MOD 30 MIN: CPT | Performed by: PSYCHIATRY & NEUROLOGY

## 2019-11-21 PROCEDURE — 83735 ASSAY OF MAGNESIUM: CPT | Performed by: INTERNAL MEDICINE

## 2019-11-21 PROCEDURE — 97116 GAIT TRAINING THERAPY: CPT

## 2019-11-21 RX ORDER — AMLODIPINE BESYLATE 5 MG/1
5 TABLET ORAL
Status: DISCONTINUED | OUTPATIENT
Start: 2019-11-21 | End: 2019-11-22

## 2019-11-21 RX ORDER — CLONIDINE HYDROCHLORIDE 0.1 MG/1
0.1 TABLET ORAL EVERY 4 HOURS PRN
Status: DISCONTINUED | OUTPATIENT
Start: 2019-11-21 | End: 2019-11-23 | Stop reason: HOSPADM

## 2019-11-21 RX ORDER — BISACODYL 10 MG
10 SUPPOSITORY, RECTAL RECTAL ONCE
Status: COMPLETED | OUTPATIENT
Start: 2019-11-21 | End: 2019-11-21

## 2019-11-21 RX ORDER — HYDRALAZINE HYDROCHLORIDE 50 MG/1
50 TABLET, FILM COATED ORAL EVERY 8 HOURS SCHEDULED
Status: DISCONTINUED | OUTPATIENT
Start: 2019-11-21 | End: 2019-11-23 | Stop reason: HOSPADM

## 2019-11-21 RX ADMIN — DOCUSATE SODIUM 100 MG: 100 CAPSULE, LIQUID FILLED ORAL at 21:16

## 2019-11-21 RX ADMIN — ACETAMINOPHEN 650 MG: 325 TABLET, FILM COATED ORAL at 06:01

## 2019-11-21 RX ADMIN — CLONIDINE HYDROCHLORIDE 0.1 MG: 0.1 TABLET ORAL at 02:30

## 2019-11-21 RX ADMIN — HYDRALAZINE HYDROCHLORIDE 25 MG: 25 TABLET, FILM COATED ORAL at 05:40

## 2019-11-21 RX ADMIN — FAMOTIDINE 40 MG: 40 TABLET ORAL at 09:39

## 2019-11-21 RX ADMIN — ASPIRIN 81 MG 81 MG: 81 TABLET ORAL at 09:39

## 2019-11-21 RX ADMIN — HYDRALAZINE HYDROCHLORIDE 50 MG: 50 TABLET, FILM COATED ORAL at 14:25

## 2019-11-21 RX ADMIN — SODIUM CHLORIDE, PRESERVATIVE FREE 10 ML: 5 INJECTION INTRAVENOUS at 21:16

## 2019-11-21 RX ADMIN — CLOPIDOGREL BISULFATE 75 MG: 75 TABLET ORAL at 09:39

## 2019-11-21 RX ADMIN — BISACODYL 10 MG: 10 SUPPOSITORY RECTAL at 18:30

## 2019-11-21 RX ADMIN — SODIUM CHLORIDE, PRESERVATIVE FREE 10 ML: 5 INJECTION INTRAVENOUS at 09:39

## 2019-11-21 RX ADMIN — ACETAMINOPHEN 650 MG: 325 TABLET, FILM COATED ORAL at 21:16

## 2019-11-21 RX ADMIN — HYDRALAZINE HYDROCHLORIDE 50 MG: 50 TABLET, FILM COATED ORAL at 21:16

## 2019-11-21 RX ADMIN — AMLODIPINE BESYLATE 5 MG: 5 TABLET ORAL at 12:32

## 2019-11-22 PROBLEM — F03.91 MAJOR NEUROCOGNITIVE DISORDER POSSIBLY DUE TO VASCULAR DISEASE, WITH BEHAVIORAL DISTURBANCE (HCC): Status: ACTIVE | Noted: 2019-11-22

## 2019-11-22 PROBLEM — R45.851 SUICIDAL IDEATIONS: Status: ACTIVE | Noted: 2019-11-22

## 2019-11-22 LAB
25(OH)D3 SERPL-MCNC: 16.6 NG/ML (ref 30–100)
HIV1+2 AB SER QL: NORMAL
RPR SER QL: NORMAL

## 2019-11-22 PROCEDURE — G0432 EIA HIV-1/HIV-2 SCREEN: HCPCS | Performed by: PSYCHIATRY & NEUROLOGY

## 2019-11-22 PROCEDURE — 86592 SYPHILIS TEST NON-TREP QUAL: CPT | Performed by: PSYCHIATRY & NEUROLOGY

## 2019-11-22 PROCEDURE — G0378 HOSPITAL OBSERVATION PER HR: HCPCS

## 2019-11-22 PROCEDURE — 99213 OFFICE O/P EST LOW 20 MIN: CPT | Performed by: PSYCHIATRY & NEUROLOGY

## 2019-11-22 PROCEDURE — 82306 VITAMIN D 25 HYDROXY: CPT | Performed by: PSYCHIATRY & NEUROLOGY

## 2019-11-22 RX ORDER — AMLODIPINE BESYLATE 10 MG/1
10 TABLET ORAL
Status: DISCONTINUED | OUTPATIENT
Start: 2019-11-22 | End: 2019-11-23 | Stop reason: HOSPADM

## 2019-11-22 RX ORDER — OLANZAPINE 5 MG/1
5 TABLET, ORALLY DISINTEGRATING ORAL EVERY 8 HOURS PRN
Status: DISCONTINUED | OUTPATIENT
Start: 2019-11-22 | End: 2019-11-23 | Stop reason: HOSPADM

## 2019-11-22 RX ORDER — ESCITALOPRAM OXALATE 5 MG/1
5 TABLET ORAL DAILY
Status: DISCONTINUED | OUTPATIENT
Start: 2019-11-22 | End: 2019-11-23 | Stop reason: HOSPADM

## 2019-11-22 RX ADMIN — HYDRALAZINE HYDROCHLORIDE 50 MG: 50 TABLET, FILM COATED ORAL at 06:12

## 2019-11-22 RX ADMIN — SODIUM CHLORIDE, PRESERVATIVE FREE 10 ML: 5 INJECTION INTRAVENOUS at 09:15

## 2019-11-22 RX ADMIN — HYDROCORTISONE: 1 CREAM TOPICAL at 00:41

## 2019-11-22 RX ADMIN — ASPIRIN 81 MG 81 MG: 81 TABLET ORAL at 09:15

## 2019-11-22 RX ADMIN — AMLODIPINE BESYLATE 10 MG: 10 TABLET ORAL at 09:15

## 2019-11-22 RX ADMIN — HYDROCORTISONE: 1 CREAM TOPICAL at 09:16

## 2019-11-22 RX ADMIN — HYDROCORTISONE: 1 CREAM TOPICAL at 20:19

## 2019-11-22 RX ADMIN — ACETAMINOPHEN 650 MG: 325 TABLET, FILM COATED ORAL at 09:15

## 2019-11-22 RX ADMIN — ESCITALOPRAM 5 MG: 5 TABLET, FILM COATED ORAL at 17:32

## 2019-11-22 RX ADMIN — DOCUSATE SODIUM 100 MG: 100 CAPSULE, LIQUID FILLED ORAL at 09:15

## 2019-11-22 RX ADMIN — HYDRALAZINE HYDROCHLORIDE 50 MG: 50 TABLET, FILM COATED ORAL at 14:01

## 2019-11-22 RX ADMIN — HYDRALAZINE HYDROCHLORIDE 50 MG: 50 TABLET, FILM COATED ORAL at 20:19

## 2019-11-22 RX ADMIN — CLOPIDOGREL BISULFATE 75 MG: 75 TABLET ORAL at 09:15

## 2019-11-22 RX ADMIN — SODIUM CHLORIDE, PRESERVATIVE FREE 10 ML: 5 INJECTION INTRAVENOUS at 20:20

## 2019-11-22 RX ADMIN — CLONIDINE HYDROCHLORIDE 0.1 MG: 0.1 TABLET ORAL at 04:49

## 2019-11-22 RX ADMIN — DOCUSATE SODIUM 100 MG: 100 CAPSULE, LIQUID FILLED ORAL at 20:19

## 2019-11-22 RX ADMIN — FAMOTIDINE 40 MG: 40 TABLET ORAL at 09:15

## 2019-11-23 VITALS
RESPIRATION RATE: 20 BRPM | HEIGHT: 72 IN | TEMPERATURE: 96.5 F | DIASTOLIC BLOOD PRESSURE: 74 MMHG | SYSTOLIC BLOOD PRESSURE: 162 MMHG | BODY MASS INDEX: 18.88 KG/M2 | WEIGHT: 139.4 LBS | OXYGEN SATURATION: 97 % | HEART RATE: 101 BPM

## 2019-11-23 PROCEDURE — 90674 CCIIV4 VAC NO PRSV 0.5 ML IM: CPT | Performed by: INTERNAL MEDICINE

## 2019-11-23 PROCEDURE — 25010000002 INFLUENZA VAC SUBUNIT QUAD 0.5 ML SUSPENSION PREFILLED SYRINGE: Performed by: INTERNAL MEDICINE

## 2019-11-23 PROCEDURE — G0008 ADMIN INFLUENZA VIRUS VAC: HCPCS | Performed by: INTERNAL MEDICINE

## 2019-11-23 PROCEDURE — G0378 HOSPITAL OBSERVATION PER HR: HCPCS

## 2019-11-23 RX ORDER — AMLODIPINE BESYLATE 10 MG/1
10 TABLET ORAL
Qty: 30 TABLET | Refills: 0 | Status: SHIPPED | OUTPATIENT
Start: 2019-11-24 | End: 2019-12-24

## 2019-11-23 RX ORDER — ESCITALOPRAM OXALATE 5 MG/1
5 TABLET ORAL DAILY
Qty: 30 TABLET | Refills: 0 | Status: SHIPPED | OUTPATIENT
Start: 2019-11-24 | End: 2019-12-24

## 2019-11-23 RX ORDER — PSEUDOEPHEDRINE HCL 30 MG
100 TABLET ORAL 2 TIMES DAILY
Qty: 60 EACH | Refills: 0 | Status: SHIPPED | OUTPATIENT
Start: 2019-11-23 | End: 2019-12-23

## 2019-11-23 RX ORDER — OLANZAPINE 5 MG/1
5 TABLET, ORALLY DISINTEGRATING ORAL 2 TIMES DAILY
Qty: 60 TABLET | Refills: 0 | Status: SHIPPED | OUTPATIENT
Start: 2019-11-23 | End: 2019-12-23

## 2019-11-23 RX ORDER — HYDRALAZINE HYDROCHLORIDE 50 MG/1
50 TABLET, FILM COATED ORAL EVERY 8 HOURS SCHEDULED
Qty: 90 TABLET | Refills: 0 | Status: SHIPPED | OUTPATIENT
Start: 2019-11-23 | End: 2019-12-23

## 2019-11-23 RX ADMIN — HYDRALAZINE HYDROCHLORIDE 50 MG: 50 TABLET, FILM COATED ORAL at 06:04

## 2019-11-23 RX ADMIN — FAMOTIDINE 40 MG: 40 TABLET ORAL at 09:19

## 2019-11-23 RX ADMIN — ESCITALOPRAM 5 MG: 5 TABLET, FILM COATED ORAL at 09:19

## 2019-11-23 RX ADMIN — HYDROCORTISONE: 1 CREAM TOPICAL at 09:24

## 2019-11-23 RX ADMIN — AMLODIPINE BESYLATE 10 MG: 10 TABLET ORAL at 09:19

## 2019-11-23 RX ADMIN — ASPIRIN 81 MG 81 MG: 81 TABLET ORAL at 09:19

## 2019-11-23 RX ADMIN — CLOPIDOGREL BISULFATE 75 MG: 75 TABLET ORAL at 09:19

## 2019-11-23 RX ADMIN — SODIUM CHLORIDE, PRESERVATIVE FREE 10 ML: 5 INJECTION INTRAVENOUS at 09:24

## 2019-11-23 RX ADMIN — DOCUSATE SODIUM 100 MG: 100 CAPSULE, LIQUID FILLED ORAL at 09:19

## 2019-11-23 RX ADMIN — INFLUENZA A VIRUS A/IDAHO/07/2018 (H1N1) ANTIGEN (MDCK CELL DERIVED, PROPIOLACTONE INACTIVATED, INFLUENZA A VIRUS A/INDIANA/08/2018 (H3N2) ANTIGEN (MDCK CELL DERIVED, PROPIOLACTONE INACTIVATED), INFLUENZA B VIRUS B/SINGAPORE/INFTT-16-0610/2016 ANTIGEN (MDCK CELL DERIVED, PROPIOLACTONE INACTIVATED), INFLUENZA B VIRUS B/IOWA/06/2017 ANTIGEN (MDCK CELL DERIVED, PROPIOLACTONE INACTIVATED) 0.5 ML: 15; 15; 15; 15 INJECTION, SUSPENSION INTRAMUSCULAR at 11:36

## 2019-11-24 ENCOUNTER — READMISSION MANAGEMENT (OUTPATIENT)
Dept: CALL CENTER | Facility: HOSPITAL | Age: 84
End: 2019-11-24

## 2019-11-24 NOTE — OUTREACH NOTE
Prep Survey      Responses   Facility patient discharged from?  [facility]   Is patient eligible?  Yes   Discharge diagnosis  Altered mental status probably due to dementia   Does the patient have one of the following disease processes/diagnoses(primary or secondary)?  Other   Does the patient have Home health ordered?  No   Is there a DME ordered?  No   Prep survey completed?  Yes          Edelmira Liang RN

## 2019-11-25 ENCOUNTER — READMISSION MANAGEMENT (OUTPATIENT)
Dept: CALL CENTER | Facility: HOSPITAL | Age: 84
End: 2019-11-25

## 2019-11-25 NOTE — OUTREACH NOTE
Medical Week 1 Survey      Responses   Facility patient discharged from?  [facility]   Does the patient have one of the following disease processes/diagnoses(primary or secondary)?  Other   Is there a successful TCM telephone encounter documented?  No   Week 1 attempt successful?  Yes   Call start time  1722   Rescheduled  Rescheduled-pt requested   Call end time  1722   Discharge diagnosis  Altered mental status probably due to dementia          Oni Irby RN

## 2019-11-26 ENCOUNTER — READMISSION MANAGEMENT (OUTPATIENT)
Dept: CALL CENTER | Facility: HOSPITAL | Age: 84
End: 2019-11-26

## 2019-11-26 ENCOUNTER — TELEPHONE (OUTPATIENT)
Dept: FAMILY MEDICINE CLINIC | Facility: CLINIC | Age: 84
End: 2019-11-26

## 2019-11-26 NOTE — OUTREACH NOTE
Medical Week 1 Survey      Responses   Facility patient discharged from?  [facility]   Does the patient have one of the following disease processes/diagnoses(primary or secondary)?  Other   Is there a successful TCM telephone encounter documented?  No   Week 1 attempt successful?  Yes   Call start time  1241   Call end time  1248   General alerts for this patient  ONLY SPEAK TO MARKELL, daughter------POA,  pt and wife are confused   Discharge diagnosis  Altered mental status probably due to dementia   Is patient permission given to speak with other caregiver?  Yes   List who call center can speak with  daughter   Person spoke with today (if not patient) and relationship  Daughter   Meds reviewed with patient/caregiver?  Yes   Is the patient having any side effects they believe may be caused by any medication additions or changes?  No   Does the patient have all medications ordered at discharge?  No   What is keeping the patient from filling the prescriptions?  -- [Decided to not get Zyprexa. states he doesn't need it]   Is the patient taking all medications as directed (includes completed medication regime)?  Yes   Medication comments  denies any SI/HI ideation   Does the patient have a primary care provider?   Yes   Does the patient have an appointment with their PCP within 7 days of discharge?  Greater than 7 days   Comments regarding PCP  Radha Irizarry, APRN Dec 3, 2019   What is preventing the patient from scheduling follow up appointments within 7 days of discharge?  Earlier appointment not available   Nursing Interventions  Verified appointment date/time/provider   Has the patient kept scheduled appointments due by today?  N/A   Has home health visited the patient within 72 hours of discharge?  N/A   Psychosocial issues?  No   Did the patient receive a copy of their discharge instructions?  Yes   Nursing interventions  Reviewed instructions with patient   What is the patient's perception of their health  status since discharge?  Improving   Is the patient/caregiver able to teach back signs and symptoms related to disease process for when to call PCP?  Yes   Is the patient/caregiver able to teach back signs and symptoms related to disease process for when to call 911?  Yes   Is the patient/caregiver able to teach back the hierarchy of who to call/visit for symptoms/problems? PCP, Specialist, Home health nurse, Urgent Care, ED, 911  Yes   Additional teach back comments  Daughter is staying with patient and wife 24/7.    Week 1 call completed?  Yes          Kinjal Rand RN

## 2019-12-04 ENCOUNTER — READMISSION MANAGEMENT (OUTPATIENT)
Dept: CALL CENTER | Facility: HOSPITAL | Age: 84
End: 2019-12-04

## 2019-12-04 NOTE — OUTREACH NOTE
Medical Week 2 Survey      Responses   Facility patient discharged from?  [facility]   Does the patient have one of the following disease processes/diagnoses(primary or secondary)?  Other   Week 2 attempt successful?  No   Unsuccessful attempts  Attempt 1          Angelika Matos RN

## 2019-12-05 ENCOUNTER — READMISSION MANAGEMENT (OUTPATIENT)
Dept: CALL CENTER | Facility: HOSPITAL | Age: 84
End: 2019-12-05

## 2019-12-05 NOTE — OUTREACH NOTE
Medical Week 2 Survey      Responses   Facility patient discharged from?  [facility]   Does the patient have one of the following disease processes/diagnoses(primary or secondary)?  Other   Week 2 attempt successful?  Yes   Call start time  1515   Discharge diagnosis  Altered mental status probably due to dementia   Call end time  1518   Meds reviewed with patient/caregiver?  Yes   Is the patient having any side effects they believe may be caused by any medication additions or changes?  No   Does the patient have all medications ordered at discharge?  Yes   Is the patient taking all medications as directed (includes completed medication regime)?  Yes   Medication comments  started on lasix and miralax.   Does the patient have a primary care provider?   Yes   Does the patient have an appointment with their PCP within 7 days of discharge?  Yes   Has the patient kept scheduled appointments due by today?  Yes   Psychosocial issues?  No   Did the patient receive a copy of their discharge instructions?  Yes   Nursing interventions  Reviewed instructions with patient   What is the patient's perception of their health status since discharge?  Same   Is the patient/caregiver able to teach back signs and symptoms related to disease process for when to call PCP?  Yes   Is the patient/caregiver able to teach back signs and symptoms related to disease process for when to call 911?  Yes   Is the patient/caregiver able to teach back the hierarchy of who to call/visit for symptoms/problems? PCP, Specialist, Home health nurse, Urgent Care, ED, 911  Yes   Additional teach back comments  daughter stated pt just fell, douing ok, she will call doctor or bring to ER iff needed.   Week 2 Call Completed?  Yes          Radha Cortez RN

## 2019-12-13 ENCOUNTER — READMISSION MANAGEMENT (OUTPATIENT)
Dept: CALL CENTER | Facility: HOSPITAL | Age: 84
End: 2019-12-13

## 2019-12-13 NOTE — OUTREACH NOTE
Medical Week 3 Survey      Responses   Facility patient discharged from?  [facility]   Does the patient have one of the following disease processes/diagnoses(primary or secondary)?  Other   Week 3 attempt successful?  No   Unsuccessful attempts  Attempt 1          Angelika Matos RN

## 2019-12-16 ENCOUNTER — READMISSION MANAGEMENT (OUTPATIENT)
Dept: CALL CENTER | Facility: HOSPITAL | Age: 84
End: 2019-12-16

## 2019-12-16 NOTE — OUTREACH NOTE
Medical Week 3 Survey      Responses   Facility patient discharged from?  [facility] [place] [facility]   Does the patient have one of the following disease processes/diagnoses(primary or secondary)?  Other   Week 3 attempt successful?  No   Unsuccessful attempts  Attempt 2          Mary Mooney RN

## 2019-12-26 ENCOUNTER — OFFICE VISIT (OUTPATIENT)
Dept: CARDIAC SURGERY | Facility: CLINIC | Age: 84
End: 2019-12-26

## 2019-12-26 VITALS
BODY MASS INDEX: 22.89 KG/M2 | DIASTOLIC BLOOD PRESSURE: 78 MMHG | WEIGHT: 169 LBS | SYSTOLIC BLOOD PRESSURE: 144 MMHG | HEIGHT: 72 IN | OXYGEN SATURATION: 98 % | HEART RATE: 93 BPM

## 2019-12-26 DIAGNOSIS — I65.23 OCCLUSION AND STENOSIS OF BILATERAL CAROTID ARTERIES: Primary | ICD-10-CM

## 2019-12-26 PROCEDURE — 99213 OFFICE O/P EST LOW 20 MIN: CPT | Performed by: NURSE PRACTITIONER

## 2019-12-26 RX ORDER — FUROSEMIDE 20 MG/1
20 TABLET ORAL DAILY
COMMUNITY

## 2019-12-26 RX ORDER — AMLODIPINE BESYLATE 10 MG/1
10 TABLET ORAL DAILY
COMMUNITY

## 2019-12-26 RX ORDER — ASPIRIN 81 MG/1
81 TABLET, CHEWABLE ORAL DAILY
COMMUNITY

## 2019-12-26 RX ORDER — HYDRALAZINE HYDROCHLORIDE 50 MG/1
50 TABLET, FILM COATED ORAL 3 TIMES DAILY
COMMUNITY

## 2019-12-26 RX ORDER — ESCITALOPRAM OXALATE 5 MG/1
5 TABLET ORAL DAILY
COMMUNITY

## 2019-12-26 NOTE — PATIENT INSTRUCTIONS
moderate Carotid Artery Stenosis right slightly worsened Asymptomatic  RIGHT Carotid Stent patent.  mild Carotid Artery Stenosis left remained stable Asymptomatic  Medical Management: ASA,PLAVIX   If you should experience any neurological symptoms including but not limited to visual or speech disturbances confusion, seizures, or weakness of limbs of one side of your body notify [facility] center immediately for evaluation or if after hours present to the nearest Emergency Department.    Return 1 year

## 2019-12-26 NOTE — PROGRESS NOTES
CVTS Office Progress Note       Subjective   Patient ID: Zeeshan Arnett is a 84 y.o. male is here today for follow-up.    Chief Complaint:    Chief Complaint   Patient presents with   • Follow-up     1 yr carotid stenosis       The following portions of the patient's history were reviewed and updated as appropriate: allergies, current medications, past family history, past medical history, past social history, past surgical history and problem list.  Recent images independently reviewed.  Available laboratory values reviewed.    PCP:  Radha Irizarry APRN    84 y.o. male with HTN, Carotid Stenosis, laryngeal CA (completed XRT 8/2015).  moderate bruit noted on exam. carotid stenosis noted on CT neck chest for 3 month cancer follow up.  asymptomatic.  no TIA, Stroke, Amaurosis fugax.  quit smoking 6/2015.  no other associated symptoms or modifying factors. Returns for follow up carotid stenting. Recovery was complicated by severe bradycardia-asymptomatic. Following with Dr Omer.    12/28/2015 CT Neck Chest:  ill defined fascia planes, soft tissue prominence epiglottis, supraglottic region.  no new mass or adenopathy.  dense calcification both carotid bulbs.  SCOTT 70%.  degenerative CSpine.  stable scattered bilateral pulmonary nodules.  2/22/2016 Carotid Duplex:  SCOTT 80-99%, antegrade.  LICA 16-49% antegrade.  3/10/16: RIGHT Carotid Stent        3/21/16: RIGHT Carotid duplex: SCOTT 16-49% mPSV 125cm/s (mid), Stent. Minimal        CCA Plaque        5/2/16: Carotid duplex: SCOTT 16-49% Patent Stent. LICA 16-49% Antegrade flow.         11/14/17: Carotid duplex: SCOTT 0-49% Patent Stent. LICA 0-49% Antegrade flow.        12/12/18: Carotid duplex: SCOTT 0-49% Patent Stent. LICA 0-49% Antegrade flow        12/26/19: Carotid Duplex: RIGHT 50-69% (135/2.9) LEFT 0-49% (123/1.2) Antegrade    Past Medical History:   Diagnosis Date   • Essential hypertension    • Malignant neoplasm of larynx (CMS/HCC)      unspecified -  XRT completed 8/2015      • Occlusion and stenosis of bilateral carotid arteries     SCOTT 90%, LICA 30% 3/10/16: RIGHT CAROTID STENT      • Plantar wart      Past Surgical History:   Procedure Laterality Date   • CATARACT EXTRACTION Right 01/19/2017   • EXCISION LESION  04/03/2015   • TRANSESOPHAGEAL ECHOCARDIOGRAM (BINTA)  02/22/2016    with color flow-Normal LV function with Ef of 65%.Normal RV size and function.Grade 1A diastolic dysfunction.Dilated tubular ascending aorta at 4.3 cm.No sig.valvular regurg or stenosis.     Family History   Problem Relation Age of Onset   • Heart disease Other    • Bone cancer Brother    • Throat cancer Brother      Social History     Tobacco Use   • Smoking status: Former Smoker   • Smokeless tobacco: Never Used   Substance Use Topics   • Alcohol use: No   • Drug use: No       ALLERGIES:   Patient has no known allergies.    MEDICATIONS:      Current Outpatient Medications:   •  amLODIPine (NORVASC) 10 MG tablet, Take 10 mg by mouth Daily., Disp: , Rfl:   •  aspirin 81 MG chewable tablet, Chew 81 mg Daily., Disp: , Rfl:   •  clopidogrel (PLAVIX) 75 MG tablet, TAKE 1 TABLET BY MOUTH DAILY., Disp: 90 tablet, Rfl: 4  •  docusate sodium (COLACE) 50 MG capsule, Take  by mouth 2 (Two) Times a Day., Disp: , Rfl:   •  escitalopram (LEXAPRO) 5 MG tablet, Take 5 mg by mouth Daily., Disp: , Rfl:   •  furosemide (LASIX) 20 MG tablet, Take 20 mg by mouth Daily., Disp: , Rfl:   •  hydrALAZINE (APRESOLINE) 50 MG tablet, Take 50 mg by mouth 3 (Three) Times a Day., Disp: , Rfl:     Review of Systems   Constitution: Positive for decreased appetite.   Eyes: Negative for visual disturbance.   Cardiovascular: Negative for claudication and cyanosis.   Respiratory: Negative for shortness of breath.    Hematologic/Lymphatic: Does not bruise/bleed easily.   Skin: Negative for color change and nail changes.   Musculoskeletal: Positive for arthritis. Negative for muscle weakness.   Gastrointestinal: Negative  for dysphagia.   Neurological: Positive for loss of balance and weakness. Negative for focal weakness, light-headedness, numbness and paresthesias.   Psychiatric/Behavioral: Positive for memory loss. Negative for altered mental status. The patient is not nervous/anxious.    All other systems reviewed and are negative.       Objective   Heart Rate:  [93] 93  BP: (144)/(78) 144/78  Body mass index is 22.92 kg/m².  Physical Exam   Constitutional: He is oriented to person, place, and time. He appears well-nourished.   HENT:   Head: Atraumatic.   Eyes: EOM are normal.   Neck: Neck supple. Carotid bruit is not present.   Cardiovascular: Normal rate and intact distal pulses.   Pulmonary/Chest: Effort normal and breath sounds normal.   Abdominal: Soft. Bowel sounds are normal.   Musculoskeletal: Normal range of motion. He exhibits no edema.   Walker-unsteady gait   Neurological: He is alert and oriented to person, place, and time.   Skin: Skin is warm and dry. Capillary refill takes less than 2 seconds.   No venous staining   Psychiatric: He has a normal mood and affect. Thought content normal.   Vitals reviewed.        Assessment & Plan     Independent Review of Radiographic Studies:  Detailed discussion regarding risks, benefits, and treatment plan. Images independently reviewed. Patient understands, agrees, and wishes to proceed with plan.     1. Occlusion and stenosis of bilateral carotid arteries  moderate Carotid Artery Stenosis right slightly worsened Asymptomatic  RIGHT Carotid Stent patent.  mild Carotid Artery Stenosis left remained stable Asymptomatic  Medical Management: ASA,PLAVIX   If you should experience any neurological symptoms including but not limited to visual or speech disturbances confusion, seizures, or weakness of limbs of one side of your body notify [facility] center immediately for evaluation or if after hours present to the nearest Emergency Department.    Return 1 year  - Duplex Carotid  Ultrasound CAR; Future            This document has been electronically signed by AZUCENA uGzman on December 26, 2019 2:17 PM

## 2021-03-23 ENCOUNTER — BULK ORDERING (OUTPATIENT)
Dept: CASE MANAGEMENT | Facility: OTHER | Age: 86
End: 2021-03-23

## 2021-03-23 DIAGNOSIS — Z23 IMMUNIZATION DUE: ICD-10-CM
